# Patient Record
Sex: FEMALE | URBAN - METROPOLITAN AREA
[De-identification: names, ages, dates, MRNs, and addresses within clinical notes are randomized per-mention and may not be internally consistent; named-entity substitution may affect disease eponyms.]

---

## 2017-12-20 ENCOUNTER — APPOINTMENT (RX ONLY)
Dept: URBAN - METROPOLITAN AREA CLINIC 91 | Facility: CLINIC | Age: 82
Setting detail: DERMATOLOGY
End: 2017-12-20

## 2017-12-20 DIAGNOSIS — L82.1 OTHER SEBORRHEIC KERATOSIS: ICD-10-CM

## 2017-12-20 DIAGNOSIS — L57.0 ACTINIC KERATOSIS: ICD-10-CM

## 2017-12-20 PROBLEM — L85.3 XEROSIS CUTIS: Status: ACTIVE | Noted: 2017-12-20

## 2017-12-20 PROBLEM — J44.9 CHRONIC OBSTRUCTIVE PULMONARY DISEASE, UNSPECIFIED: Status: ACTIVE | Noted: 2017-12-20

## 2017-12-20 PROBLEM — I10 ESSENTIAL (PRIMARY) HYPERTENSION: Status: ACTIVE | Noted: 2017-12-20

## 2017-12-20 PROBLEM — Z85.3 PERSONAL HISTORY OF MALIGNANT NEOPLASM OF BREAST: Status: ACTIVE | Noted: 2017-12-20

## 2017-12-20 PROCEDURE — 17000 DESTRUCT PREMALG LESION: CPT

## 2017-12-20 PROCEDURE — 17003 DESTRUCT PREMALG LES 2-14: CPT

## 2017-12-20 PROCEDURE — ? LIQUID NITROGEN

## 2017-12-20 PROCEDURE — 99213 OFFICE O/P EST LOW 20 MIN: CPT | Mod: 25

## 2017-12-20 PROCEDURE — ? COUNSELING

## 2017-12-20 ASSESSMENT — LOCATION ZONE DERM
LOCATION ZONE: HAND
LOCATION ZONE: ARM
LOCATION ZONE: FACE
LOCATION ZONE: TRUNK
LOCATION ZONE: EAR

## 2017-12-20 ASSESSMENT — LOCATION SIMPLE DESCRIPTION DERM
LOCATION SIMPLE: LEFT FOREARM
LOCATION SIMPLE: LEFT CHEEK
LOCATION SIMPLE: LEFT HAND
LOCATION SIMPLE: ABDOMEN
LOCATION SIMPLE: RIGHT HAND
LOCATION SIMPLE: LEFT EAR

## 2017-12-20 ASSESSMENT — PAIN INTENSITY VAS: HOW INTENSE IS YOUR PAIN 0 BEING NO PAIN, 10 BEING THE MOST SEVERE PAIN POSSIBLE?: NO PAIN

## 2017-12-20 ASSESSMENT — LOCATION DETAILED DESCRIPTION DERM
LOCATION DETAILED: LEFT DISTAL DORSAL FOREARM
LOCATION DETAILED: RIGHT RADIAL DORSAL HAND
LOCATION DETAILED: LEFT SUPERIOR HELIX
LOCATION DETAILED: LEFT PROXIMAL DORSAL FOREARM
LOCATION DETAILED: LEFT RADIAL DORSAL HAND
LOCATION DETAILED: LEFT CENTRAL MALAR CHEEK
LOCATION DETAILED: LEFT LATERAL ABDOMEN
LOCATION DETAILED: LEFT ULNAR DORSAL HAND

## 2017-12-20 NOTE — PROCEDURE: LIQUID NITROGEN
Number Of Freeze-Thaw Cycles: 1 freeze-thaw cycle
Consent: The patient's verbal consent was obtained including but not limited to risks of crusting, scabbing, blistering, scarring, darker or lighter pigmentary change, recurrence, incomplete removal and infection.
Detail Level: Detailed
Render Post-Care Instructions In Note?: no
Duration Of Freeze Thaw-Cycle (Seconds): 5
Post-Care Instructions: I reviewed with the patient in detail post-care instructions. Patient is to wear sunprotection, and avoid picking at any of the treated lesions. Pt may apply Vaseline to crusted or scabbing areas. If any treated lesion does not resolve patient is asked to return for further treatment and/or biopsy.

## 2018-08-16 ENCOUNTER — HOSPITAL ENCOUNTER (INPATIENT)
Dept: HOSPITAL 92 - ERS | Age: 83
LOS: 5 days | Discharge: HOSPICE | DRG: 871 | End: 2018-08-21
Attending: HOSPITALIST | Admitting: HOSPITALIST
Payer: MEDICARE

## 2018-08-16 VITALS — BODY MASS INDEX: 28.6 KG/M2

## 2018-08-16 DIAGNOSIS — J18.9: ICD-10-CM

## 2018-08-16 DIAGNOSIS — A41.52: Primary | ICD-10-CM

## 2018-08-16 DIAGNOSIS — N17.9: ICD-10-CM

## 2018-08-16 DIAGNOSIS — I42.9: ICD-10-CM

## 2018-08-16 DIAGNOSIS — L89.152: ICD-10-CM

## 2018-08-16 DIAGNOSIS — C50.912: ICD-10-CM

## 2018-08-16 DIAGNOSIS — Z66: ICD-10-CM

## 2018-08-16 DIAGNOSIS — B96.4: ICD-10-CM

## 2018-08-16 DIAGNOSIS — I48.92: ICD-10-CM

## 2018-08-16 DIAGNOSIS — G89.29: ICD-10-CM

## 2018-08-16 DIAGNOSIS — I11.0: ICD-10-CM

## 2018-08-16 DIAGNOSIS — J44.1: ICD-10-CM

## 2018-08-16 DIAGNOSIS — J44.0: ICD-10-CM

## 2018-08-16 DIAGNOSIS — E03.9: ICD-10-CM

## 2018-08-16 DIAGNOSIS — E87.6: ICD-10-CM

## 2018-08-16 DIAGNOSIS — E87.4: ICD-10-CM

## 2018-08-16 DIAGNOSIS — J96.00: ICD-10-CM

## 2018-08-16 DIAGNOSIS — R65.21: ICD-10-CM

## 2018-08-16 DIAGNOSIS — N39.0: ICD-10-CM

## 2018-08-16 DIAGNOSIS — B96.5: ICD-10-CM

## 2018-08-16 DIAGNOSIS — M54.9: ICD-10-CM

## 2018-08-16 DIAGNOSIS — Z88.5: ICD-10-CM

## 2018-08-16 DIAGNOSIS — F17.210: ICD-10-CM

## 2018-08-16 DIAGNOSIS — Z51.5: ICD-10-CM

## 2018-08-16 DIAGNOSIS — I50.21: ICD-10-CM

## 2018-08-16 LAB
ALBUMIN SERPL BCG-MCNC: 3.8 G/DL (ref 3.4–4.8)
ALP SERPL-CCNC: 63 U/L (ref 40–150)
ALT SERPL W P-5'-P-CCNC: 10 U/L (ref 8–55)
ANION GAP SERPL CALC-SCNC: 13 MMOL/L (ref 10–20)
AST SERPL-CCNC: 21 U/L (ref 5–34)
BACTERIA UR QL AUTO: (no result) HPF
BASOPHILS # BLD AUTO: 0.1 THOU/UL (ref 0–0.2)
BASOPHILS NFR BLD AUTO: 0.5 % (ref 0–1)
BILIRUB SERPL-MCNC: 0.7 MG/DL (ref 0.2–1.2)
BUN SERPL-MCNC: 14 MG/DL (ref 9.8–20.1)
CALCIUM SERPL-MCNC: 9.2 MG/DL (ref 7.8–10.44)
CHLORIDE SERPL-SCNC: 98 MMOL/L (ref 98–107)
CO2 SERPL-SCNC: 21 MMOL/L (ref 23–31)
CREAT CL PREDICTED SERPL C-G-VRATE: 0 ML/MIN (ref 70–130)
CRYSTAL-AUWI FLAG: 0 (ref 0–15)
EOSINOPHIL # BLD AUTO: 0 THOU/UL (ref 0–0.7)
EOSINOPHIL NFR BLD AUTO: 0.2 % (ref 0–10)
GLOBULIN SER CALC-MCNC: 2.6 G/DL (ref 2.4–3.5)
GLUCOSE SERPL-MCNC: 222 MG/DL (ref 83–110)
HEV IGM SER QL: 0.1 (ref 0–7.99)
HGB BLD-MCNC: 15 G/DL (ref 12–16)
LYMPHOCYTES # BLD: 0.9 THOU/UL (ref 1.2–3.4)
LYMPHOCYTES NFR BLD AUTO: 7.6 % (ref 21–51)
MCH RBC QN AUTO: 33.5 PG (ref 27–31)
MCV RBC AUTO: 95.6 FL (ref 78–98)
MONOCYTES # BLD AUTO: 0.4 THOU/UL (ref 0.11–0.59)
MONOCYTES NFR BLD AUTO: 2.9 % (ref 0–10)
NEUTROPHILS # BLD AUTO: 10.5 THOU/UL (ref 1.4–6.5)
NEUTROPHILS NFR BLD AUTO: 88.8 % (ref 42–75)
PATHC CAST-AUWI FLAG: 1.59 (ref 0–2.49)
PLATELET # BLD AUTO: 261 THOU/UL (ref 130–400)
POTASSIUM SERPL-SCNC: 3.5 MMOL/L (ref 3.5–5.1)
RBC # BLD AUTO: 4.48 MILL/UL (ref 4.2–5.4)
RBC UR QL AUTO: (no result) HPF (ref 0–3)
SODIUM SERPL-SCNC: 128 MMOL/L (ref 136–145)
SP GR UR STRIP: 1.01 (ref 1–1.04)
SPERM-AUWI FLAG: 0 (ref 0–9.9)
WBC # BLD AUTO: 11.9 THOU/UL (ref 4.8–10.8)
WBC UR QL AUTO: (no result) HPF (ref 0–3)
YEAST-AUWI FLAG: 0 (ref 0–25)

## 2018-08-16 PROCEDURE — 87186 SC STD MICRODIL/AGAR DIL: CPT

## 2018-08-16 PROCEDURE — 74176 CT ABD & PELVIS W/O CONTRAST: CPT

## 2018-08-16 PROCEDURE — 96367 TX/PROPH/DG ADDL SEQ IV INF: CPT

## 2018-08-16 PROCEDURE — 80048 BASIC METABOLIC PNL TOTAL CA: CPT

## 2018-08-16 PROCEDURE — 89051 BODY FLUID CELL COUNT: CPT

## 2018-08-16 PROCEDURE — 83615 LACTATE (LD) (LDH) ENZYME: CPT

## 2018-08-16 PROCEDURE — 87040 BLOOD CULTURE FOR BACTERIA: CPT

## 2018-08-16 PROCEDURE — 82805 BLOOD GASES W/O2 SATURATION: CPT

## 2018-08-16 PROCEDURE — 85060 BLOOD SMEAR INTERPRETATION: CPT

## 2018-08-16 PROCEDURE — 88112 CYTOPATH CELL ENHANCE TECH: CPT

## 2018-08-16 PROCEDURE — C9113 INJ PANTOPRAZOLE SODIUM, VIA: HCPCS

## 2018-08-16 PROCEDURE — 83605 ASSAY OF LACTIC ACID: CPT

## 2018-08-16 PROCEDURE — 94760 N-INVAS EAR/PLS OXIMETRY 1: CPT

## 2018-08-16 PROCEDURE — 51701 INSERT BLADDER CATHETER: CPT

## 2018-08-16 PROCEDURE — 71045 X-RAY EXAM CHEST 1 VIEW: CPT

## 2018-08-16 PROCEDURE — 88342 IMHCHEM/IMCYTCHM 1ST ANTB: CPT

## 2018-08-16 PROCEDURE — 88305 TISSUE EXAM BY PATHOLOGIST: CPT

## 2018-08-16 PROCEDURE — 80202 ASSAY OF VANCOMYCIN: CPT

## 2018-08-16 PROCEDURE — 36556 INSERT NON-TUNNEL CV CATH: CPT

## 2018-08-16 PROCEDURE — 93010 ELECTROCARDIOGRAM REPORT: CPT

## 2018-08-16 PROCEDURE — 87205 SMEAR GRAM STAIN: CPT

## 2018-08-16 PROCEDURE — 82945 GLUCOSE OTHER FLUID: CPT

## 2018-08-16 PROCEDURE — 83986 ASSAY PH BODY FLUID NOS: CPT

## 2018-08-16 PROCEDURE — 87116 MYCOBACTERIA CULTURE: CPT

## 2018-08-16 PROCEDURE — 36415 COLL VENOUS BLD VENIPUNCTURE: CPT

## 2018-08-16 PROCEDURE — 96375 TX/PRO/DX INJ NEW DRUG ADDON: CPT

## 2018-08-16 PROCEDURE — 84100 ASSAY OF PHOSPHORUS: CPT

## 2018-08-16 PROCEDURE — 80053 COMPREHEN METABOLIC PANEL: CPT

## 2018-08-16 PROCEDURE — 81003 URINALYSIS AUTO W/O SCOPE: CPT

## 2018-08-16 PROCEDURE — 70450 CT HEAD/BRAIN W/O DYE: CPT

## 2018-08-16 PROCEDURE — 84157 ASSAY OF PROTEIN OTHER: CPT

## 2018-08-16 PROCEDURE — 96365 THER/PROPH/DIAG IV INF INIT: CPT

## 2018-08-16 PROCEDURE — 96361 HYDRATE IV INFUSION ADD-ON: CPT

## 2018-08-16 PROCEDURE — 94640 AIRWAY INHALATION TREATMENT: CPT

## 2018-08-16 PROCEDURE — 93306 TTE W/DOPPLER COMPLETE: CPT

## 2018-08-16 PROCEDURE — 81015 MICROSCOPIC EXAM OF URINE: CPT

## 2018-08-16 PROCEDURE — 87070 CULTURE OTHR SPECIMN AEROBIC: CPT

## 2018-08-16 PROCEDURE — 85025 COMPLETE CBC W/AUTO DIFF WBC: CPT

## 2018-08-16 PROCEDURE — 94660 CPAP INITIATION&MGMT: CPT

## 2018-08-16 PROCEDURE — 93005 ELECTROCARDIOGRAM TRACING: CPT

## 2018-08-16 PROCEDURE — 87206 SMEAR FLUORESCENT/ACID STAI: CPT

## 2018-08-16 PROCEDURE — 87077 CULTURE AEROBIC IDENTIFY: CPT

## 2018-08-16 PROCEDURE — 87149 DNA/RNA DIRECT PROBE: CPT

## 2018-08-16 PROCEDURE — 88341 IMHCHEM/IMCYTCHM EA ADD ANTB: CPT

## 2018-08-16 PROCEDURE — 87086 URINE CULTURE/COLONY COUNT: CPT

## 2018-08-16 PROCEDURE — 83735 ASSAY OF MAGNESIUM: CPT

## 2018-08-16 PROCEDURE — A4353 INTERMITTENT URINARY CATH: HCPCS

## 2018-08-16 NOTE — RAD
RADIOGRAPH CHEST 1 VIEW:

 

Date: 8/16/18

Time: 7:50 p.m.

 

HISTORY: 

84-year-old female with fever. 

 

COMPARISON: 

1/1/16. 

 

FINDINGS:

Ectasia and tortuosity of the thoracic aorta. Prior studies were lateral decubitus views, and therefo
re direct comparison of the apparently enlarged right hilar shadow, is difficult. Lungs are hypoinfla
nasra. There is a region of nonspecific increased attenuation at the right lateral base. No consolidati
on or pulmonary edema in the rest of the visualized lung fields. No pneumothorax. No effacement of la
teral costophrenic angles. 

 

IMPRESSION:

1.      Nonspecific area of faintly low attenuation at the right lateral base. This does not appear t
ypical for an infiltrate, and perhaps is artifact. 

2.      No evidence of pneumonia elsewhere.

3.      enlarged appearance of the right hilum.

 

 

JN []

 

POS: JIN

## 2018-08-17 LAB
ANION GAP SERPL CALC-SCNC: 12 MMOL/L (ref 10–20)
BASOPHILS # BLD AUTO: 0 THOU/UL (ref 0–0.2)
BASOPHILS NFR BLD AUTO: 0.1 % (ref 0–1)
BUN SERPL-MCNC: 11 MG/DL (ref 9.8–20.1)
CALCIUM SERPL-MCNC: 8 MG/DL (ref 7.8–10.44)
CHLORIDE SERPL-SCNC: 107 MMOL/L (ref 98–107)
CO2 SERPL-SCNC: 17 MMOL/L (ref 23–31)
CREAT CL PREDICTED SERPL C-G-VRATE: 74 ML/MIN (ref 70–130)
EOSINOPHIL # BLD AUTO: 0 THOU/UL (ref 0–0.7)
EOSINOPHIL NFR BLD AUTO: 0.2 % (ref 0–10)
GLUCOSE SERPL-MCNC: 185 MG/DL (ref 83–110)
HGB BLD-MCNC: 13.9 G/DL (ref 12–16)
LYMPHOCYTES # BLD: 1.9 THOU/UL (ref 1.2–3.4)
LYMPHOCYTES NFR BLD AUTO: 16.4 % (ref 21–51)
MCH RBC QN AUTO: 32 PG (ref 27–31)
MCV RBC AUTO: 96.4 FL (ref 78–98)
MONOCYTES # BLD AUTO: 0.9 THOU/UL (ref 0.11–0.59)
MONOCYTES NFR BLD AUTO: 7.9 % (ref 0–10)
NEUTROPHILS # BLD AUTO: 8.9 THOU/UL (ref 1.4–6.5)
NEUTROPHILS NFR BLD AUTO: 75.3 % (ref 42–75)
PLATELET # BLD AUTO: 199 THOU/UL (ref 130–400)
POTASSIUM SERPL-SCNC: 3.4 MMOL/L (ref 3.5–5.1)
RBC # BLD AUTO: 4.34 MILL/UL (ref 4.2–5.4)
SODIUM SERPL-SCNC: 133 MMOL/L (ref 136–145)
WBC # BLD AUTO: 11.8 THOU/UL (ref 4.8–10.8)

## 2018-08-17 PROCEDURE — 3E033XZ INTRODUCTION OF VASOPRESSOR INTO PERIPHERAL VEIN, PERCUTANEOUS APPROACH: ICD-10-PCS | Performed by: HOSPITALIST

## 2018-08-17 PROCEDURE — 02HV33Z INSERTION OF INFUSION DEVICE INTO SUPERIOR VENA CAVA, PERCUTANEOUS APPROACH: ICD-10-PCS | Performed by: INTERNAL MEDICINE

## 2018-08-17 RX ADMIN — VANCOMYCIN HYDROCHLORIDE SCH MLS: 1 INJECTION, POWDER, LYOPHILIZED, FOR SOLUTION INTRAVENOUS at 16:46

## 2018-08-17 RX ADMIN — ASPIRIN SCH MG: 81 TABLET ORAL at 08:32

## 2018-08-17 NOTE — PDOC.PULCN
<Jean Pierre Galvan - Last Filed: 08/17/18 10:13>





Pulmonology Consult: HPI





- Date of Consult


Date: 08/17/18


Time: 08:00





- Consult Details


Reason for Consult: 





ICU admission needed pressure support





- History of Present Illness


HPI: 


TU VALLADARES is a 84 year-old F





83 yo F admitted after collapsing at home. She states that she was having a 

normal day prior to the event and suddenly felt weak and collapsed. She denies 

LOC or hitting her head. In the ED she was noted to have a UA c/w UTI, though 

pt denies symptoms such as frequency or dysuria. She was noted to be 

hypotensive and required pressure support. R SC CVC was placed, however the 

distal tip is in the IJ. Patient was started on Vanc and zosyn in the ER. This 

morning pt complains of new cough and wheezing. She denies prior hx of similar 

symptoms. 





Pulmonology Consult: ROS





- Review of Systems


Constitutional: sweats, weakness.  negative: fever, chills


Cardiovascular: negative: chest pain, palpitations, edema


Respiratory: productive cough, wheezing.  negative: bloody sputum, short of 

breath





Pulmonology Consult: PMH


Source: patient


Past Medical History: 





HTN, L Breast Ca s/p lumpectomy, hypothyroid





- Family History


Family history: reviewed and not pertinent





- Social History


Smoking Status: Current every day smoker, Smokes 11 or more cig/day


Pack Years: 40


Alcohol Use: none


Drug Use History: none


Living Situation: independent





Pulmonology Consult: Meds





- Medications


MAR Reviewed: Yes


Medications: 


 Current Medications





Acetaminophen (Tylenol)  650 mg PO Q4H PRN


   PRN Reason: Headache/Fever or Pain


Aspirin (Ecotrin)  81 mg PO DAILY [facility]


Enoxaparin Sodium (Lovenox)  40 mg SC 0900 [facility]


Gabapentin (Neurontin)  300 mg PO HS [facility]


Piperacillin Sod/Tazobactam (Sod 3.375 gm/ Sodium Chloride)  100 mls @ 200 mls/

hr IVPB Q6HR [facility]


   Last Admin: 08/17/18 05:40 Dose:  100 mls


Norepinephrine Bitartrate (Levophed)  250 mls @ 0 mls/hr IVPB INF [facility]; Protocol


Sodium Chloride (Normal Saline 0.9%)  1,000 mls @ 100 mls/hr IV .Q10H [facility]


   Last Admin: 08/17/18 03:27 Dose:  1,000 mls


Vancomycin HCl 1.25 gm/ Sodium (Chloride)  250 mls @ 166.667 mls/hr IVPB 1600 

[facility]


Levothyroxine Sodium (Synthroid)  100 mcg PO 0600 [facility]


   Last Admin: 08/17/18 05:44 Dose:  100 mcg


Miscellaneous Medication (Pharmacy To Dose)  0 each IVPB PRN PRN


   PRN Reason: VANCOMYCIN


Ondansetron HCl (Zofran)  4 mg IVP Q6H PRN


   PRN Reason: Nausea/Vomiting


Pantoprazole Sodium (Protonix)  40 mg PO DAILY [facility]











- Allergies


Allergies/Adverse Reactions: 


 Allergies











Allergy/AdvReac Type Severity Reaction Status Date / Time


 


morphine Allergy   Verified 12/31/15 21:24














Pulmonology Consult: PE





- Physical Exam


Constitutional: NAD


HEENT: PERRLA, moist MMs


Neck: no nodes, no JVD


Cardiovascular: RRR


Deviation from normal: Difficult to asucultate heart sounds over wheezing


Respiratory: wheezes (throughout)


Gastrointestinal: soft, non-tender, no distention, positive bowel sounds


Musculoskeletal: no edema


Neurological: non-focal, normal sensation, moves all 4 limbs


Psychiatric: normal affect, A&O x 3


Skin: no rash, normal turgor





Pulmonology Consult: Results





- Labs


Result Diagrams: 


 08/17/18 04:27





 08/17/18 04:26





- Radiology Interpretation


  ** CT scan - chest


Status: image reviewed by me, report reviewed by me (b/l pleural effusion, 13 

mm nodule in L lingula)





  ** Chest x-ray


Status: image reviewed by me, report reviewed by me (SC CVC ending in R IJ, 

pulmonary vs chest wall density L lung)





  ** CT scan - abdomen


Status: image reviewed by me, report reviewed by me (Soft tissue mass L 

posterior L4)





Pulmonology Consult: A/P





- Problem


(1) Sepsis


Current Visit: No   Code(s): A41.9 - SEPSIS, UNSPECIFIED ORGANISM   Status: 

Acute   





(2) COPD (chronic obstructive pulmonary disease)


Current Visit: Yes   Status: Suspected   





(3) UTI (urinary tract infection)


Current Visit: Yes   Status: Acute   





(4) Soft tissue mass


Current Visit: Yes   Code(s): M79.9 - SOFT TISSUE DISORDER, UNSPECIFIED   Status

: Acute   





- Time


Time: 


50% of the time was spent in coordination of care (as documented) at patient's 

floor/unit and/or counseling patient.





Time with Patient: greater than 50 minutes





- Plan


Plan: 





Urosepsis 


- pt doing much better this morning that at time of admission. No longer 

requiring pressure support. Pt has received adequate IVF. 


- change abx to rocephin daily, cultures pending 


- may be appropriate to leave ICU today if BP continues to be stable





COPD


- this is the most likely cause of the patient's wheezing. Though she does not 

carry the dx, her hx is certainly c/w COPD. 


- not currently requiring O2, does not have home O2


- Continue to monitir





Soft tissue mass posterior L4


- incidental finding on CT. May need further work up in outpt setting. 





HTN


- BP currently stable. Restart home meds when appropriate





<Quentin Perez - Last Filed: 08/17/18 12:36>





Pulmonology Consult: HPI





- History of Present Illness


HPI: 


TU VALLADARES is a 84 year-old F











Pulmonology Consult: Meds





- Medications


Medications: 


 Current Medications





Acetaminophen (Tylenol)  650 mg PO Q4H PRN


   PRN Reason: Headache/Fever or Pain


Aspirin (Ecotrin)  81 mg PO DAILY [facility]


   Last Admin: 08/17/18 08:32 Dose:  81 mg


Enoxaparin Sodium (Lovenox)  40 mg SC 0900 [facility]


   Last Admin: 08/17/18 08:32 Dose:  40 mg


Gabapentin (Neurontin)  300 mg PO HS [facility]


Piperacillin Sod/Tazobactam (Sod 3.375 gm/ Sodium Chloride)  100 mls @ 200 mls/

hr IVPB Q6HR [facility]


   Last Admin: 08/17/18 05:40 Dose:  100 mls


Norepinephrine Bitartrate (Levophed)  250 mls @ 0 mls/hr IVPB INF [facility]; Protocol


Sodium Chloride (Normal Saline 0.9%)  1,000 mls @ 100 mls/hr IV .Q10H [facility]


   Last Admin: 08/17/18 03:27 Dose:  1,000 mls


Vancomycin HCl 1.25 gm/ Sodium (Chloride)  250 mls @ 166.667 mls/hr IVPB 1600 

[facility]


Levothyroxine Sodium (Synthroid)  100 mcg PO 0600 [facility]


   Last Admin: 08/17/18 05:44 Dose:  100 mcg


Miscellaneous Medication (Pharmacy To Dose)  0 each IVPB PRN PRN


   PRN Reason: VANCOMYCIN


Ondansetron HCl (Zofran)  4 mg IVP Q6H PRN


   PRN Reason: Nausea/Vomiting


   Last Admin: 08/17/18 08:28 Dose:  4 mg


Pantoprazole Sodium (Protonix)  40 mg PO DAILY [facility]


   Last Admin: 08/17/18 08:32 Dose:  40 mg











Pulmonology Consult: Results





- Labs


Result Diagrams: 


 08/17/18 04:27





 08/17/18 04:26





Pulmonology Consult: A/P





- Time


Time: 


50% of the time was spent in coordination of care (as documented) at patient's 

floor/unit and/or counseling patient.








Attending Addendum





- Attending Addendum


Date/Time: 08/17/18 0341





I personally evaluated the patient and discussed the management with Dr. Galvan


I agree with the History, Examination, Assessment and Plan documented above 

with any addition or exceptions noted below.





70 minutes have been devoted to this patient in various activities.  I 

personally reviewed all imaging studies and laboratory data noted within this 

document.  For fifty percent of this time, I was interacting with the patient 

at the bedside or coordinating care with the care team.  For the remainder of 

the time I was immediately available to the patient in the hospital unit.

## 2018-08-17 NOTE — HP
PRIMARY CARE PHYSICIAN:   _____.

 

CODE STATUS:  FULL CODE.

 

TIME OF EVALUATION:  11:45 p.m.

 

CHIEF COMPLAINT:  Confusion.

 

HISTORY OF PRESENT ILLNESS:  This is an 84-year-old female patient with past medical history of hyper
tension, left breast cancer without treatment, chronic back pain.  The patient came to the hospital a
fter having a fall, patient had generalized weakness, symptoms were severe, no clear triggers, no all
eviating factors associated with disorientation, confusion.  Patient was found to have fever in the E
R.

 

REVIEW OF SYSTEMS:  Unable to fully obtain since patient has a change in mental status.

 

PAST MEDICAL HISTORY:  Mentioned on the HPI.

 

PAST SURGICAL HISTORY:  Appendectomy, hysterectomy, left breast biopsy.

 

PSYCHIATRIC HISTORY:  No previous psychiatric history.

 

FAMILY HISTORY:  Reviewed and noncontributory for current presentation.

 

SOCIAL HISTORY:  Patient used tobacco, smokes cigarettes daily.  Patient smoked 1 pack per day.

 

DRUG ALLERGIES:  MORPHINE.

 

REPORTED MEDICATIONS:  Levothyroxine, captopril, aspirin, gabapentin.

 

PHYSICAL EXAMINATION:

VITAL SIGNS:  On presentation blood pressure 113/62 with heart rate 120, pain was 0, oxygen saturatio
ns 90.

CONSTITUTIONAL:  Patient had fever, chills, generalized weakness, severe, disoriented, not in any acu
te distress.

HEENT:  Eyes:  Normal conjuctivae.  Dry oral mucosa.  Anicteric.

NECK:  No JVD.

RESPIRATORY:  Bilateral air entry.  No rales, no wheezing.  Symmetric expansion.

CARDIOVASCULAR:  Normal rate, regular rhythm.  No murmurs, no gallop, no edema.

ABDOMEN:  Soft, normal bowel sounds.

MUSCULOSKELETAL:  Baseline range of motion and strength.  No tenderness.

SKIN:  Warm and intact.  No pallor, no rash, no redness.  Peripheral pulses are present.  Capillary r
efill seems to be intact.

NEUROLOGIC:  Baseline sensory.  No evidence of any new focal weakness.  Baseline speech.  Cranial ner
ves seem to be intact.

PSYCHIATRIC:  Patient is disoriented, unable to fully explore.

 

LABORATORY DATA:  EKG was reviewed.  The patient has sinus tachycardia with first-degree AV block, le
ft axis deviation, complete RBBB, septal infarct.  T-wave abnormality consider inferior lateral ische
emily, ventricular rate 116, , , QT corrected 453.  Chest x-ray was reviewed.  The patient
 had no specific area of pain to the low attenuation of the right lateral base.  Perhaps, this does n
ot appear typical for an infiltrate perhaps represent artifacts.  No evidence of pneumonia _____ enla
rgement of the right ilium.

 

LABORATORY DATA:  Reviewed.  The patient's white count 11.9, hemoglobin 15, MCV 95, platelet count 26
1.  Sodium 128, potassium 3.5, chloride 98, carbon dioxide 21, anion gap 13, BUN 14, creatinine 0.7, 
GFR 71, glucose 222.  Urine was positive, white count 21-50.

 

ASSESSMENT AND PLAN:  The patient will be placed in the hospital with following medical conditions:  
Critical care time more than 35 minutes span at bedside for patient stabilization, assessment of the 
patient, family meeting, coordination of care, reviewing the elaboration of records.

1.  Patient is in septic shock with fever, hypotension needing vasopressors, patient also receiving a
ntibiotics, we will follow cultures, we will adjust treatment as needed.  We will do CAT scan wheneve
r patient is stable to rule out complicated urinary tract infection.

2.   Urinary tract infection, positive UA, urine culture pending.  We will follow results, we will ad
just treatment as needed.  Management as above.  Patient received a full liter of fluids. 

3.  Uncontrolled hypertension.  Patient presented with shock, did not give any blood pressure medicat
ion at this point.

4.  Hypothyroidism.  Continue hormone replacement.

5.  Chronic obstructive pulmonary disease, this is chronic, seems to be stable, we will reconcile eriberto
e medications.

6.  Deep venous thrombosis prophylaxis.

7.  History of left breast malignancy, patient has opted for no treatment.

## 2018-08-17 NOTE — PDOC.EVN
Event Note





- Event Note


Event Note: 





central line was in not appropriate position, needs to be removed and placement 

of a new one if still needed for vasopressors

## 2018-08-17 NOTE — PDOC.PN
- Subjective


Encounter Start Date: 08/17/18


Encounter Start Time: 09:00





Doing well today.  Feeling better.  Did not have any symptoms of the UTI.  





- Objective


Resuscitation Status: 


 











Resuscitation Status           FULL:Full Resuscitation














Vital Signs & Weight: 


 Vital Signs (12 hours)











  Temp Pulse Pulse BP BP Pulse Ox Pulse Ox


 


 08/17/18 19:00  99.0 F      


 


 08/17/18 16:00  98.1 F      


 


 08/17/18 14:44   97  103 H  108/81  132/64  96  95








 Weight











Weight                         171 lb 15.369 oz











 Most Recent Monitor Data











Heart Rate from ECG            97


 


NIBP                           120/65


 


NIBP BP-Mean                   92


 


Respiration from ECG           24


 


SpO2                           100














I&O: 


 











 08/16/18 08/17/18 08/18/18





 06:59 06:59 06:59


 


Intake Total  511.2 1940


 


Output Total   1575


 


Balance  511.2 365











Result Diagrams: 


 08/17/18 04:27





 08/17/18 04:26





Phys Exam





- Physical Examination


Constitutional: NAD


Respiratory: no wheezing, no rales, no rhonchi, wheezing present


Cardiovascular: RRR, no significant murmur


Gastrointestinal: soft, non-tender, no distention


Musculoskeletal: no edema


Psychiatric: normal affect





Dx/Plan


(1) UTI (urinary tract infection)


Status: Acute   





(2) COPD (chronic obstructive pulmonary disease)


Status: Suspected   





(3) Sepsis


Code(s): A41.9 - SEPSIS, UNSPECIFIED ORGANISM   Status: Acute   





- Plan





* Continue abx.  Follow up cultures. Transfer to floor.  Off of pressors.

## 2018-08-17 NOTE — RAD
RADIOGRAPH CHEST 1 VIEW:

 

Date: 08/17/18

Time: 0039 HOURS

 

HISTORY: 

84-year-old female status post central line placement. 

 

COMPARISON: 

08/16/18 at 1950 hours. 

 

FINDINGS:

There is a new right subclavian central vascular catheter which ascends the right neck, with distal t
ip outside of the field of view. Prominent right hilum is again noted. Elevated right hemidiaphragm. 
Small, ill-defined region of increased attenuation at the lateral aspect of the left lower lung zone,
 apparently new since the prior study. No pulmonary edema. No pneumothorax. 

 

IMPRESSION:

1.  Right subclavian central vascular catheter ascends the right neck. 

2.  No pneumothorax. 

3.  Questionable new focal pulmonary versus chest wall density at left lateral lower lung zone versus
 artifact. Recommend follow-up. 

4.  Enlargement of right hilum. 

 

 

 

STEPHENIE [] 

 

POS: REN

## 2018-08-17 NOTE — CT
PRELIMINARY REPORT/[facility]/EMERGENTY AFTER-HOURS PROCEDURE 

 

Addendum created by Saulo Sood MD on 8/17/2018 6:53 AM Central Time ([place] & [place]) Findings were dis
cussed with Winston Tan at 8/17/2018 6:51 AM CDT.

 

Initial Report created on 8/17/2018 6:46 AM Central Time ([place] & [place])

 

CT Abdomen and Pelvis Without Intravenous Contrast

 

EXAM DATE/TIME:

Exam ordered 8/17/2018 5:12 AM

 

CLINICAL HISTORY:

84 years old, female; Pain; Abdominal pain; Generalized; Patient HX: R/O complicated uti

 

TECHNIQUE:

Axial computed tomography images of the abdomen and pelvis without intravenous contrast. Coronal refo
rmatted images were created and reviewed.

 

COMPARISON:

No relevant prior studies available.

 

FINDINGS:

Lung bases: There is a 13 mm nodule within the LEFT lingula.

Pleural space: There are small bilateral pleural effusions and interlobular thickening consistent wit
h pulmonary edema.

 

ABDOMEN:

Liver: The liver demonstrates punctate calcifications, consistent with remote granulomatous organism 
exposure.

Gallbladder and bile ducts: The gallbladder is normal. There is no evidence of biliary ductal dilatio
n. No calcified stones.

Pancreas: The pancreas appears normal. No ductal dilation.

Spleen: The spleen demonstrates punctate calcifications, consistent with remote granulomatous organis
m exposure.

Adrenals: The adrenal glands are normal.

Kidneys and ureters: The kidneys appear normal. No obstructing stones. No hydronephrosis.

Stomach and bowel: The stomach is normal. The duodenum is unremarkable. The colon is normal.

No obstruction. No mucosal thickening.

 

PELVIS:

Appendix: No findings to suggest acute appendicitis.

Bladder: The bladder is normal. No stones.

Reproductive: The uterus is not visualized, and may be atrophic or surgically absent.

 

ABDOMEN and PELVIS:

Intraperitoneal space: Normal. No free air. No significant fluid collection.

Bones/joints: There is a 2.7 x 1.6 x 2.0 cm soft tissue mass within the LEFT posterior elements of L4
. There are moderate lumbar spine degenerative changes. No acute fracture. No dislocation.

Soft tissues: There is questionable LEFT breast soft tissue thickening, incompletely visualized.

Vasculature: Normal. No abdominal aortic aneurysm.

Lymph nodes: Normal. No enlarged lymph nodes.

 

IMPRESSION:

1. There is a 13 mm nodule within the LEFT lingula.

2. There is a 2.7 x 1.6 x 2.0 cm soft tissue mass within the LEFT posterior elements of L4. Further w
orkup is recommended.

3. Small bilateral pleural effusions with mild pulmonary edema.

 

Thank you for allowing us to participate in the care of your patient.

Dictated and Authenticated by: Saulo Sood MD

08/17/2018 6:46 AM Central Time ([place] & [place])

 

 

FINAL REPORT 

 

CT ABDOMEN AND PELVIS WITHOUT CONTRAST:

 

HISTORY: 

Complicated urinary tract infection.

 

COMPARISON: 

None.

 

FINDINGS: 

The findings and impression are concordant with the preliminary report.  In addition, there is abnorm
al wall skin thickening over the left breast.

 

IMPRESSION: 

Findings and impression are concordant with the preliminary report.  Diagnostic mammography and ultra
sound is recommended.  After workup of this left breast soft tissue thickening, histologic sampling m
ay be necessary at the L4 posterior element mass.

 

CODE: T

 

POS: REN

## 2018-08-18 LAB
ANALYZER IN CARDIO: (no result)
ANALYZER IN CARDIO: (no result)
ANION GAP SERPL CALC-SCNC: 14 MMOL/L (ref 10–20)
ANION GAP SERPL CALC-SCNC: 16 MMOL/L (ref 10–20)
BASE EXCESS STD BLDA CALC-SCNC: -11.8 MEQ/L
BASE EXCESS STD BLDA CALC-SCNC: -8.1 MEQ/L
BUN SERPL-MCNC: 10 MG/DL (ref 9.8–20.1)
BUN SERPL-MCNC: 13 MG/DL (ref 9.8–20.1)
CA-I BLDA-SCNC: 1.2 MMOL/L (ref 1.12–1.3)
CA-I BLDA-SCNC: 1.2 MMOL/L (ref 1.12–1.3)
CALCIUM SERPL-MCNC: 8.2 MG/DL (ref 7.8–10.44)
CALCIUM SERPL-MCNC: 8.6 MG/DL (ref 7.8–10.44)
CHLORIDE SERPL-SCNC: 102 MMOL/L (ref 98–107)
CHLORIDE SERPL-SCNC: 103 MMOL/L (ref 98–107)
CO2 SERPL-SCNC: 19 MMOL/L (ref 23–31)
CO2 SERPL-SCNC: 19 MMOL/L (ref 23–31)
CREAT CL PREDICTED SERPL C-G-VRATE: 43 ML/MIN (ref 70–130)
CREAT CL PREDICTED SERPL C-G-VRATE: 45 ML/MIN (ref 70–130)
GLUCOSE SERPL-MCNC: 268 MG/DL (ref 83–110)
GLUCOSE SERPL-MCNC: 305 MG/DL (ref 83–110)
HCO3 BLDA-SCNC: 17.8 MEQ/L (ref 22–28)
HCO3 BLDA-SCNC: 20 MEQ/L (ref 22–28)
HCT VFR BLDA CALC: 44 % (ref 36–47)
HCT VFR BLDA CALC: 45 % (ref 36–47)
HGB BLD-MCNC: 14.8 G/DL (ref 12–16)
HGB BLDA-MCNC: 14.9 G/DL (ref 12–16)
HGB BLDA-MCNC: 15.3 G/DL (ref 12–16)
MAGNESIUM SERPL-MCNC: 1.7 MG/DL (ref 1.6–2.6)
MCH RBC QN AUTO: 32.4 PG (ref 27–31)
MCV RBC AUTO: 98.4 FL (ref 78–98)
MDIFF COMPLETE?: YES
O2 A-A PPRESDIFF RESPIRATORY: 543.3 MM[HG] (ref 0–20)
PCO2 BLDA: 50.8 MMHG (ref 35–45)
PCO2 BLDA: 54.8 MMHG (ref 35–45)
PH BLDA: 7.13 [PH] (ref 7.35–7.45)
PH BLDA: 7.21 [PH] (ref 7.35–7.45)
PLATELET # BLD AUTO: 207 THOU/UL (ref 130–400)
PO2 BLDA: 87.8 MMHG (ref 60–?)
PO2 BLDA: 96.2 MMHG (ref 60–?)
POTASSIUM SERPL-SCNC: 3.7 MMOL/L (ref 3.5–5.1)
POTASSIUM SERPL-SCNC: 3.7 MMOL/L (ref 3.5–5.1)
RBC # BLD AUTO: 4.56 MILL/UL (ref 4.2–5.4)
SODIUM SERPL-SCNC: 131 MMOL/L (ref 136–145)
SODIUM SERPL-SCNC: 134 MMOL/L (ref 136–145)
SPECIMEN DRAWN FROM PATIENT: (no result)
SPECIMEN DRAWN FROM PATIENT: (no result)
VANCOMYCIN TROUGH SERPL-MCNC: 7.9 UG/ML
WBC # BLD AUTO: 22 THOU/UL (ref 4.8–10.8)

## 2018-08-18 RX ADMIN — VANCOMYCIN HYDROCHLORIDE SCH: 1 INJECTION, POWDER, LYOPHILIZED, FOR SOLUTION INTRAVENOUS at 16:40

## 2018-08-18 RX ADMIN — ASPIRIN SCH: 81 TABLET ORAL at 16:40

## 2018-08-18 NOTE — PDOC.PN
- Subjective


Encounter Start Date: 08/18/18


Encounter Start Time: 10:15





She took a turn for worse during the night.  She is having some respiratory 

failure and requiring non-rebreather.  Non-verbal.





- Objective


Resuscitation Status: 


 











Resuscitation Status           DNR:Do Not Resuscitate














Vital Signs & Weight: 


 Vital Signs (12 hours)











  Temp Pulse Resp Pulse Ox


 


 08/18/18 14:31   124 H  


 


 08/18/18 12:26   117 H  


 


 08/18/18 12:00  100.3 F H   


 


 08/18/18 10:43   120 H  


 


 08/18/18 08:00  99.7 F H  119 H  34 H  97


 


 08/18/18 07:45   119 H  


 


 08/18/18 07:00  99.8 F H   


 


 08/18/18 04:00  98.5 F    97








 Weight











Weight                         171 lb 15.369 oz











 Most Recent Monitor Data











Heart Rate from ECG            126


 


NIBP                           131/65


 


NIBP BP-Mean                   94


 


Respiration from ECG           33


 


SpO2                           80














I&O: 


 











 08/17/18 08/18/18 08/19/18





 06:59 06:59 06:59


 


Intake Total 511.2 3019 1000


 


Output Total  1955 480


 


Balance 511.2 1064 520











Result Diagrams: 


 08/18/18 08:55





 08/18/18 08:55





Phys Exam





- Physical Examination


Non-rebreather with tachypnea.  


Respiratory: no rales


Harsh bilateral wheezes and rales.


Cardiovascular: RRR, no significant murmur


Gastrointestinal: soft, non-tender, no distention


Musculoskeletal: no edema





Dx/Plan


(1) UTI (urinary tract infection)


Status: Acute   





(2) COPD (chronic obstructive pulmonary disease)


Status: Suspected   





(3) Sepsis


Code(s): A41.9 - SEPSIS, UNSPECIFIED ORGANISM   Status: Acute   





(4) Respiratory failure


Code(s): J96.90 - RESPIRATORY FAILURE, UNSP, UNSP W HYPOXIA OR HYPERCAPNIA   

Status: Acute   





(5) Pneumonia


Code(s): J18.9 - PNEUMONIA, UNSPECIFIED ORGANISM   Status: Acute   





- Plan





* She is on broad spectrum abx coverage.   Initially did well, but took a turn 

for the worse.  Suspect she is having repercussions from the hypotension from 

sepsis and suffering some end-organ damage.  Discussed with P/CC.  Trying 

bicarb for acidosis.  Family has made the patient DNR.

## 2018-08-18 NOTE — PRG
DATE OF SERVICE:  08/18/2018

 

SERVICE:  Pulmonary Medicine.

 

INTERVAL HISTORY:  The patient did poorly overnight.  She became increasingly 
lethargic.  She had increasing work of breathing and started grunting.  
Ultimately, an ABG was performed demonstrating acute hypercapnic as well as 
metabolic acidosis.  We temporized her with BiPAP.  We are in the investigation 
phase right now.  She cannot provide any additional elements of the history.  
Otherwise, there has been no interval change to her condition.

 

PHYSICAL EXAMINATION:

VITAL SIGNS:  Afebrile with temperature maximum 99.8, which is up trending, 
pulse 119 and irregular, blood pressure 114/64, respirations 30, saturation 96% 
on 60% FiO2 delivered via BiPAP at 10/5.

HEENT:  Normocephalic, atraumatic.  Sclerae are white, conjunctivae pink.  Oral 
and nasal mucosa is extremely dry.

LUNGS:  Rhonchi are present throughout bilateral lung fields.  I really do not 
appreciate crackles or wheezing.

HEART:  Tachycardic.  Regular.

ABDOMEN:  Soft, nontender, nondistended.  Bowel sounds are positive.

MUSCULOSKELETAL:  No cyanosis or clubbing.  There is no pitting in the 
bilateral lower extremities.

NEUROLOGIC:  Grossly nonfocal.

 

LABORATORY DATA:  Morning laboratories are currently pending.  We previously 
discontinued them, but because of her change in status, we are going to go 
ahead and order them.  She has gram negative rods growing in the urine culture 
and 1 out of 2 blood cultures growing coag negative staph.

 

IMAGING DATA:  Chest x-ray demonstrates small infiltrate in the right mid lung 
zone.  No evidence of pneumothorax is present.  There is blunting of bilateral 
costophrenic regions, possibly consistent with fluid.

 

ASSESSMENT:

1.  Acute hypoxic respiratory failure.

2.  Septic shock, resolving.

3.  Urinary tract infection.

4.  Chronic obstructive pulmonary disease with acute exacerbation.

5.  Combined metabolic and respiratory acidosis.

 

DISCUSSION AND PLAN:  We are going to talk to family about whether or not the 
patient would like to be aggressive under these circumstances.  I do believe 
that moving forward, she is likely going to require intubation in order to give 
this some  more time to clarify what happening here.  We are going to continue 
our empiric antibiotics.  Central line will be considered to help clarify our 
volume status.  CBC, basic metabolic profile, lactate, magnesium and phosphorus 
will be obtained under stat conditions.  Pulmonary or Critical Care will 
continue to follow along.  I have titrated BiPAP at bedside.  The patient is 
going to need quite a bit of attention throughout the day.

 

CRITICAL CARE TIME:  30 minutes.

 

HIRAL

## 2018-08-18 NOTE — RAD
SINGLE VIEW OF CHEST:

 

Date:  08/18/18 

 

COMPARISON:  

08/17/18. 

 

HISTORY:  

Intubated patient with respiratory failure. 

 

FINDINGS:

Single view of the chest shows normal sized cardiomediastinal silhouette. There is elevation of the r
ight hemidiaphragm. Bilateral lower lobe air space opacities are seen which may represent infiltrates
 and/or pleural effusions. No endotracheal tube is visualized. 

 

IMPRESSION: 

Bibasilar infiltrates with likely adjacent small pleural effusions. 

 

 

POS: [facility]

## 2018-08-19 LAB
ANION GAP SERPL CALC-SCNC: 16 MMOL/L (ref 10–20)
BASOPHILS # BLD AUTO: 0 THOU/UL (ref 0–0.2)
BASOPHILS NFR BLD AUTO: 0.1 % (ref 0–1)
BUN SERPL-MCNC: 20 MG/DL (ref 9.8–20.1)
CALCIUM SERPL-MCNC: 8.4 MG/DL (ref 7.8–10.44)
CHLORIDE SERPL-SCNC: 100 MMOL/L (ref 98–107)
CO2 SERPL-SCNC: 24 MMOL/L (ref 23–31)
CREAT CL PREDICTED SERPL C-G-VRATE: 35 ML/MIN (ref 70–130)
EOSINOPHIL # BLD AUTO: 0 THOU/UL (ref 0–0.7)
EOSINOPHIL NFR BLD AUTO: 0.2 % (ref 0–10)
GLUCOSE PLR-MCNC: 380 MG/DL
GLUCOSE SERPL-MCNC: 368 MG/DL (ref 83–110)
HGB BLD-MCNC: 14.3 G/DL (ref 12–16)
LDH PLR L TO P-CCNC: 543 U/L
LYMPHOCYTES # BLD: 0.9 THOU/UL (ref 1.2–3.4)
LYMPHOCYTES NFR BLD AUTO: 6.6 % (ref 21–51)
MCH RBC QN AUTO: 32.5 PG (ref 27–31)
MCV RBC AUTO: 98.3 FL (ref 78–98)
MONOCYTES # BLD AUTO: 0.4 THOU/UL (ref 0.11–0.59)
MONOCYTES NFR BLD AUTO: 3 % (ref 0–10)
NEUTROPHILS # BLD AUTO: 12.5 THOU/UL (ref 1.4–6.5)
NEUTROPHILS NFR BLD AUTO: 90.2 % (ref 42–75)
NEUTROPHILS NFR FLD: 78 %
NONHEMATIC CELLS NFR FLD MANUAL: 20 %
PLATELET # BLD AUTO: 89 THOU/UL (ref 130–400)
PLATELET BLD QL SMEAR: (no result)
POTASSIUM SERPL-SCNC: 3.5 MMOL/L (ref 3.5–5.1)
PROT PLR-MCNC: 1.4 G/DL
RBC # BLD AUTO: 4.4 MILL/UL (ref 4.2–5.4)
RBC # FLD AUTO: 97 /CUMM
RBC BACKGROUND COUNT: 0
SODIUM SERPL-SCNC: 136 MMOL/L (ref 136–145)
WBC # BLD AUTO: 13.8 THOU/UL (ref 4.8–10.8)
WBC # FLD AUTO: 870 /CUMM
WBC BACKGROUND COUNT: 0.01

## 2018-08-19 PROCEDURE — 0W993ZX DRAINAGE OF RIGHT PLEURAL CAVITY, PERCUTANEOUS APPROACH, DIAGNOSTIC: ICD-10-PCS | Performed by: INTERNAL MEDICINE

## 2018-08-19 NOTE — PRG
DATE OF SERVICE:  08/19/2018

 

SERVICE:  Pulmonary Medicine.

 

INTERVAL HISTORY:  The patient is actually doing quite a bit better today.  She is _____.  Her oxygen
 requirements are improving beautifully.  She does not have any specific complaints this morning.  Jojo cardenas is much more awake and oriented.  She appreciates where she is.  She remains extraordinarily weak; 
however.

 

PHYSICAL EXAMINATION:

VITAL SIGNS:  T-max overnight of 106.3, currently afebrile.  Pulse 109, blood pressure 127/74, respir
ations 15, and saturation 100% on nonrebreather currently.

GENERAL:  The patient is awake and alert.  No apparent distress.

LUNGS:  Rhonchi and crackles are present.  There is a prolonged expiratory phase with minimal wheezin
g.

HEART:  Normal rate, regular.

ABDOMEN:  Soft, nontender, nondistended.  Bowel sounds are positive.

MUSCULOSKELETAL:  No cyanosis or clubbing.  There is no pitting in the bilateral lower extremities.

NEUROLOGIC:  Grossly nonfocal.

 

LABORATORY DATA:  WBC 13.8, hemoglobin 14.3, platelets 89,000 and dropping.  Creatinine 1.49 and stab
ilizing, potassium 3.5.  Basic metabolic profile is otherwise unremarkable.  Lactate is slowly cleari
ng to 2.7.  Pseudomonas is growing in the urine, which is pansensitive.  The blood cultures are posit
lee for 2 different coag negative species in 1 out of 2.

 

IMAGING:  Chest x-ray demonstrates interval development of a very large effusion on the left. There i
s also a likely pleural effusion on the right.

 

ASSESSMENT:

1.  Acute hypoxic respiratory failure.

2.  Septic shock, resolving.

3.  Urinary tract infection.

4.  Chronic obstructive pulmonary disease with acute exacerbation.

5.  Pleural effusion, large.

 

DISCUSSION AND PLAN:  We will do a bedside ultrasound.  If we see a large pocket of fluid on the left
, this will be drained.  We will continue our empiric antibiotics.  It is not clear what has given ester ennis a severe fever, 3 days into the hospital stay on antibiotics the entire time.  She is cautiously c
learing some of her end-organ damage.  We are going to discontinue the bicarbonate drip today and keara
e her off BiPAP.  We will put her on a nonrebreather and titrate this down through time.  My suspicio
n is that a large effusion on the left caused some atelectasis resulting in the severe hypoxemia, we 
witnessed yesterday.

## 2018-08-19 NOTE — RAD
SINGLE VIEW CHEST:

 

Date:  08/19/18 

 

COMPARISON:  

08/18/18. 

 

HISTORY:  

Hypoxia. 

 

FINDINGS:

Single view of the chest shows a cardiomediastinal silhouette which is upper limits of normal in size
. There is a moderate left and a small right effusion. Adjacent atelectasis is also present. Degenera
tive changes are seen in the spine. 

 

IMPRESSION: 

Bilateral pleural effusions. 

 

 

POS: [facility]

## 2018-08-19 NOTE — PDOC.PN
- Subjective


Encounter Start Date: 08/19/18


Encounter Start Time: 10:50





No specific complaints, but has some difficulty speaking with the oxygen mask 

and tachypnea.





- Objective


Resuscitation Status: 


 











Resuscitation Status           DNR:Do Not Resuscitate














Vital Signs & Weight: 


 Vital Signs (12 hours)











  Temp Pulse Resp Pulse Ox


 


 08/19/18 12:57   108 H  17 


 


 08/19/18 07:55     100


 


 08/19/18 07:00  99.5 F   


 


 08/19/18 06:42   105 H  


 


 08/19/18 05:00  99.1 F   


 


 08/19/18 03:18   108 H  


 


 08/19/18 03:00  98.7 F   








 Weight











Weight                         171 lb 15.369 oz











 Most Recent Monitor Data











Heart Rate from ECG            108


 


NIBP                           84/57


 


NIBP BP-Mean                   67


 


Respiration from ECG           27


 


SpO2                           82














I&O: 


 











 08/18/18 08/19/18 08/20/18





 06:59 06:59 06:59


 


Intake Total 3019 4439 


 


Output Total 1955 940 50


 


Balance 1064 3499 -50











Result Diagrams: 


 08/19/18 03:45





 08/19/18 03:45





Phys Exam





- Physical Examination


Still very tachypneic.


Improved air exchange.  Still has rales and upper airway secretions. 


Cardiovascular: RRR, no significant murmur


Gastrointestinal: soft, non-tender, no distention, positive bowel sounds


Musculoskeletal: no edema





Dx/Plan


(1) Septic shock due to Pseudomonas species


Code(s): A41.52 - SEPSIS DUE TO PSEUDOMONAS; R65.21 - SEVERE SEPSIS WITH SEPTIC 

SHOCK   Status: Acute   Comment: Appeared to have some end organ damage, but 

improving now.     





(2) Acute respiratory failure with hypoxia


Code(s): J96.01 - ACUTE RESPIRATORY FAILURE WITH HYPOXIA   Status: Acute   

Comment: Improving.  Pulmonary following.  Tapped effusion this morning.     





(3) UTI (urinary tract infection)


Status: Acute   Comment: Pseudomonas.  Sensitive to the zosyn.   





(4) COPD (chronic obstructive pulmonary disease)


Status: Suspected   Comment: Still has some respiratory challenge.  Continue 

nebs, supportive oxygen.   





(5) Sepsis


Code(s): A41.9 - SEPSIS, UNSPECIFIED ORGANISM   Status: Acute   Comment: 

Stabilizing.   





(6) Respiratory failure


Code(s): J96.90 - RESPIRATORY FAILURE, UNSP, UNSP W HYPOXIA OR HYPERCAPNIA   

Status: Acute   Comment: Developed pleural effusion overnight.  Tapped by 

Pulmonary this morning.     





(7) Pneumonia


Code(s): J18.9 - PNEUMONIA, UNSPECIFIED ORGANISM   Status: Acute   Comment: 

Continue Zosyn.   





(8) Pleural effusion


Code(s): J90 - PLEURAL EFFUSION, NOT ELSEWHERE CLASSIFIED   Status: Acute   

Comment: Tapped.  Clear, straw colored fluid.   





(9) Septic shock


Code(s): A41.9 - SEPSIS, UNSPECIFIED ORGANISM; R65.21 - SEVERE SEPSIS WITH 

SEPTIC SHOCK   Status: Acute   





(10) Breast cancer


Status: Acute   Comment: Has chosen not to pursue treatment.     





(11) Paraspinal mass


Code(s): R22.2 - LOCALIZED SWELLING, MASS AND LUMP, TRUNK   Status: Acute   

Comment: Can be evaluated as outpatient when more stable.   





- Plan





* As above.


* Continue ICU care.

## 2018-08-20 LAB
ANION GAP SERPL CALC-SCNC: 15 MMOL/L (ref 10–20)
BUN SERPL-MCNC: 31 MG/DL (ref 9.8–20.1)
CALCIUM SERPL-MCNC: 8.9 MG/DL (ref 7.8–10.44)
CHLORIDE SERPL-SCNC: 101 MMOL/L (ref 98–107)
CO2 SERPL-SCNC: 24 MMOL/L (ref 23–31)
CREAT CL PREDICTED SERPL C-G-VRATE: 33 ML/MIN (ref 70–130)
GLUCOSE SERPL-MCNC: 268 MG/DL (ref 83–110)
HGB BLD-MCNC: 14.1 G/DL (ref 12–16)
MCH RBC QN AUTO: 32.8 PG (ref 27–31)
MCV RBC AUTO: 96.3 FL (ref 78–98)
MDIFF COMPLETE?: YES
PLATELET # BLD AUTO: 103 THOU/UL (ref 130–400)
PLATELET BLD QL SMEAR: (no result)
POTASSIUM SERPL-SCNC: 3.4 MMOL/L (ref 3.5–5.1)
RBC # BLD AUTO: 4.3 MILL/UL (ref 4.2–5.4)
SODIUM SERPL-SCNC: 137 MMOL/L (ref 136–145)
VANCOMYCIN TROUGH SERPL-MCNC: 26.5 UG/ML
WBC # BLD AUTO: 23.7 THOU/UL (ref 4.8–10.8)

## 2018-08-20 PROCEDURE — 5A09457 ASSISTANCE WITH RESPIRATORY VENTILATION, 24-96 CONSECUTIVE HOURS, CONTINUOUS POSITIVE AIRWAY PRESSURE: ICD-10-PCS | Performed by: HOSPITALIST

## 2018-08-20 RX ADMIN — DILTIAZEM HYDROCHLORIDE SCH MLS: 5 INJECTION INTRAVENOUS at 19:10

## 2018-08-20 NOTE — PRG
DATE OF SERVICE:  08/20/2018

 

SERVICE:  Pulmonary Medicine.

 

INTERVAL HISTORY:  The patient is doing fine from a respiratory standpoint.  She is actually doing fa
ntastic sitting up in a neuro chair today.  She denies having shortness of breath or chest discomfort
.  She did not have any events overnight.

 

PHYSICAL EXAMINATION:

VITAL SIGNS:  Afebrile with a T-max of 100.5, pulse 114, blood pressure 132/68, respirations 23, satu
ration 95% on room air.

GENERAL:  The patient is awake and alert, in no apparent distress.

LUNGS:  Rhonchi and crackles are present.  There is no prolonged expiratory phase or wheezing identif
ied.

HEART:  Normal rate, regular.

ABDOMEN:  Soft, nontender, nondistended.  Bowel sounds are positive.

MUSCULOSKELETAL:  No cyanosis or clubbing.  There is no pitting in the bilateral lower extremities.

NEUROLOGIC:  Grossly nonfocal.

 

LABORATORY DATA:  WBC is up trending to 23.7, hemoglobin 14.1, platelets 103,000 and rebounding.  Cre
atinine 1.57 and stabilizing, BUN 31.  Potassium 3.4.  Basic metabolic profile is otherwise unremarka
ble.  Thoracentesis is consistent with a parapneumonic effusion.  Pseudomonas aeruginosa is growing i
n the urine.  Blood cultures are negative in one of the two.  The other one is growing contaminants. 
 Body fluid culture and AFB smear are negative.

 

IMAGING:  Echocardiogram demonstrates 20%-25% ejection fraction with global hypokinesis.  There was s
ome mitral regurgitation present.

 

ASSESSMENT:

1.  Acute hypoxic respiratory failure, improving.

2.  Septic shock, resolving.

3.  Urinary tract infection.

4.  Chronic obstructive pulmonary disease with acute exacerbation.

5.  Acute systolic heart failure.

 

DISCUSSION AND PLAN:  We will get Cardiology consultation.  We will continue her antibiotics.  We ted
l watch her volume status very closely.  She will use her BiPAP on an as needed basis.  Otherwise, I 
do think she is going to remain in the ICU for at least the next 24 hours, but if she continues to ma
ke significant improvements, we will consider for transition to the medical unit in the morning.

## 2018-08-20 NOTE — PDOC.PN
- Subjective


Encounter Start Date: 08/03/18


Encounter Start Time: 11:44


Subjective: feels like "heck", some SOB





- Objective


Resuscitation Status: 


 











Resuscitation Status           DNR:Do Not Resuscitate














MAR Reviewed: Yes


Vital Signs & Weight: 


 Vital Signs (12 hours)











  Temp Pulse Pulse Pulse Resp BP BP


 


 08/20/18 08:15    118 H  118 H   136/62  120/62


 


 08/20/18 07:08       


 


 08/20/18 07:04   100    26 H  


 


 08/20/18 06:00  100.5 F H      


 


 08/20/18 04:00  101.0 F H      


 


 08/20/18 00:00  98.2 F      














  Pulse Ox Pulse Ox Pulse Ox


 


 08/20/18 08:15   96  98


 


 08/20/18 07:08  96  


 


 08/20/18 07:04  96  


 


 08/20/18 06:00   


 


 08/20/18 04:00   


 


 08/20/18 00:00   








 Weight











Admit Weight                   171 lb 15.369 oz


 


Weight                         185 lb 10.067 oz











 Most Recent Monitor Data











Heart Rate from ECG            111


 


NIBP                           104/55


 


NIBP BP-Mean                   65


 


Respiration from ECG           19


 


SpO2                           93














I&O: 


 











 08/19/18 08/20/18 08/21/18





 06:59 06:59 06:59


 


Intake Total 4439 1232 


 


Output Total 940 445 


 


Balance 3499 787 











Result Diagrams: 


 08/20/18 03:42





 08/20/18 03:41





Phys Exam





- Physical Examination


Neck: no JVD


bilat rhonchi, coarse DS


Cardiovascular: RRR, no significant murmur


Gastrointestinal: soft, positive bowel sounds


Musculoskeletal: edema present





Dx/Plan


(1) Acute respiratory failure with hypoxia


Code(s): J96.01 - ACUTE RESPIRATORY FAILURE WITH HYPOXIA   Status: Acute   

Comment: Improving.  Pulmonary following.  Tapped effusion this morning.     





(2) Breast cancer


Status: Chronic   Comment: Has chosen not to pursue treatment.     





(3) Pleural effusion


Code(s): J90 - PLEURAL EFFUSION, NOT ELSEWHERE CLASSIFIED   Status: Acute   

Comment: Tapped.  Clear, straw colored fluid.   





(4) Respiratory failure


Code(s): J96.90 - RESPIRATORY FAILURE, UNSP, UNSP W HYPOXIA OR HYPERCAPNIA   

Status: Acute   


Qualifiers: 


   Chronicity: acute   Respiratory failure complication: hypoxia   Qualified 

Code(s): J96.01 - Acute respiratory failure with hypoxia   


Comment: Developed pleural effusion overnight.  Tapped by Pulmonary this 

morning.     





(5) Septic shock


Code(s): A41.9 - SEPSIS, UNSPECIFIED ORGANISM; R65.21 - SEVERE SEPSIS WITH 

SEPTIC SHOCK   Status: Acute   





(6) Septic shock due to Pseudomonas species


Code(s): A41.52 - SEPSIS DUE TO PSEUDOMONAS; R65.21 - SEVERE SEPSIS WITH SEPTIC 

SHOCK   Status: Acute   Comment: Appeared to have some end organ damage, but 

improving now.     





(7) UTI (urinary tract infection)


Status: Acute   


Qualifiers: 


   Urinary tract infection type: site unspecified 


Comment: Pseudomonas.  Sensitive to the zosyn.   





(8) COPD (chronic obstructive pulmonary disease)


Status: Acute   


Qualifiers: 


   Chronic bronchitis type: unspecified 


Comment: Still has some respiratory challenge.  Continue nebs, supportive 

oxygen.   





(9) Pneumonia


Code(s): J18.9 - PNEUMONIA, UNSPECIFIED ORGANISM   Status: Acute   


Qualifiers: 


   Pneumonia type: due to unspecified organism   Laterality: bilateral   Lung 

location: lower lobe of lung   Qualified Code(s): J18.1 - Lobar pneumonia, 

unspecified organism   


Comment: Continue Zosyn.   





(10) Sepsis


Code(s): A41.9 - SEPSIS, UNSPECIFIED ORGANISM   Status: Acute   


Qualifiers: 


   Sepsis type: sepsis due to unspecified organism   Qualified Code(s): A41.9 - 

Sepsis, unspecified organism   


Comment: Stabilizing.   





- Plan


progressive renal failure


-: progressive adverse pulmnary chahges


-: cont iv antibx, sterrpoids


-: discuss with intensivist





* .

## 2018-08-20 NOTE — PQF
CLINICAL DOCUMENTATION IMPROVEMENT CLARIFICATION FORM:  ICD-10 Updated

PLEASE DO AN ADDENDUM TO THE PROGRESS NOTE WITH ANY DOCUMENTATION UPDATES OR 
ADDITIONS AND CARRY THROUGH TO DC SUMMARY.   THANK YOU.



DATE:  8/20/18                          ATTN: Dr. Douglas



Please exercise your independent, professional judgment in responding to the 
clarification form. 

Clinical indicators are provided on the bottom of this form for your review



Please check appropriate box(s):



_____x__   I (concur)  with the Nursing Assessment  findings as stated below.



[  ] Pressure Ulcer: (Stage I: Erythema; Stage II: Partial thickness; Stage III
: Full thickness; Stage IV: Necrosis to muscle/bone)

    [  ] Location: _________________________POA: [  ] Yes [  ] No[  ] Unable to 
determine

            Stage (I to IV): ____(Left____Right____Bilateral___N/A___)

    [  ] Location: _________________________POA: [  ] Yes [  ] No[  ] Unable to 
determine

            Stage (I to IV): ____(Left____Right____Bilateral___N/A___)

[  ] No pressure ulcer diagnosis

[  ] Deep tissue injury

[  ] Other diagnosis ___________

[  ] Unable to determine



In addition, please specify:

Present on Admission (POA):  [x  ] Yes             [  ] No             [  ] 
Unable to determine



For continuity of documentation, please document condition throughout progress 
notes and discharge summary.  Thank You.



CLINICAL INDICATORS - SIGNS / SYMPTOMS / LABS



ER NURSES SKIN ASSESSMENT 8/16 @ 2200: PRESSURE ULCER TO THE SACRUM, STAGE II



NURSES ASSESSMENT 8/17 @ 0329:  SACROCOCCYGEAL PRESSURE ULCER. STAGE II



RISKS: 

H&P 8/17: AGE 84. PT IS IN SEPTIC SHOCK WITH FEVER. UTI. COPD. 



TREATMENT: 

SKIN INTERVENTIONS PER NURSING PROTOCOL:  POSITION CHANGES: Q2H IN BED, Q1H IN 
CHAIR

                                                                              
SKIN KEPT FROM EXCESSIVE MOISTURE.

____________________________________________________________________





Thank you, 

Kim

(This form is maintained as a part of the permanent medical record)

 2015 [employer]  All Rights Reserved

Kim Scott RN, BSN    [email or web address]    Office: [phone]

                                                              

[facility]

## 2018-08-21 VITALS — DIASTOLIC BLOOD PRESSURE: 52 MMHG | SYSTOLIC BLOOD PRESSURE: 82 MMHG

## 2018-08-21 VITALS — TEMPERATURE: 98.7 F

## 2018-08-21 LAB
ANION GAP SERPL CALC-SCNC: 14 MMOL/L (ref 10–20)
BUN SERPL-MCNC: 41 MG/DL (ref 9.8–20.1)
CALCIUM SERPL-MCNC: 8.4 MG/DL (ref 7.8–10.44)
CHLORIDE SERPL-SCNC: 101 MMOL/L (ref 98–107)
CO2 SERPL-SCNC: 26 MMOL/L (ref 23–31)
CREAT CL PREDICTED SERPL C-G-VRATE: 31 ML/MIN (ref 70–130)
GLUCOSE SERPL-MCNC: 285 MG/DL (ref 83–110)
HGB BLD-MCNC: 13.5 G/DL (ref 12–16)
MCH RBC QN AUTO: 33 PG (ref 27–31)
MCV RBC AUTO: 96.3 FL (ref 78–98)
MDIFF COMPLETE?: YES
PLATELET # BLD AUTO: 108 THOU/UL (ref 130–400)
PLATELET BLD QL SMEAR: (no result)
POTASSIUM SERPL-SCNC: 3.1 MMOL/L (ref 3.5–5.1)
RBC # BLD AUTO: 4.09 MILL/UL (ref 4.2–5.4)
SODIUM SERPL-SCNC: 138 MMOL/L (ref 136–145)
WBC # BLD AUTO: 20 THOU/UL (ref 4.8–10.8)

## 2018-08-21 RX ADMIN — POTASSIUM CHLORIDE SCH MLS: 14.9 INJECTION, SOLUTION INTRAVENOUS at 08:57

## 2018-08-21 RX ADMIN — POTASSIUM CHLORIDE SCH MLS: 14.9 INJECTION, SOLUTION INTRAVENOUS at 08:58

## 2018-08-21 RX ADMIN — DILTIAZEM HYDROCHLORIDE SCH MLS: 5 INJECTION INTRAVENOUS at 05:20

## 2018-08-21 NOTE — CON-2
DATE OF CONSULTATION:  08/21/2018

 

CHIEF COMPLAINT:  Confusion.

 

HISTORY OF PRESENT ILLNESS:  The patient is an 84-year-old female that 
presented to the emergency room on 08/16/2018 and was found to be in urosepsis.
  The patient was also noted to have an echocardiogram that showed an EF of 20%-
25% with global hypokinesis.  Diastolic dysfunction could not be assessed 
secondary to tachycardia with mild mitral regurgitation and mild tricuspid 
regurgitation.  Upon further questioning with the family, the patient has not 
had any signs of fluid overload with lower extremity edema, orthopnea or 
paroxysmal nocturnal dyspnea over the last few months.  The patient has never 
been told that she has any sort of heart condition or irregular heartbeat.  The 
patient's family has noted that she does have a past history of breast cancer 
that they are opting not to treat as well as said she has declined in 
functioning over the last year slowly.  The family is wishing not to have any 
invasive intervention done at this time and is actually opting to not have even 
medical management completed at this time.  The patient denies any chest pain, 
any shortness of breath, any nausea, vomiting or diarrhea.  The patient also 
denies any lower extremity edema, any asymmetric swelling, any unilateral 
weakness, vision changes or headaches.  The patient's son is present for the 
interview and provides most of the history at this time.

 

PAST MEDICAL HISTORY:  Hypertension, left breast cancer without treatment, 
chronic back pain, hypothyroidism.

 

PAST SURGICAL HISTORY:  Appendectomy, hysterectomy, left breast lumpectomy.

 

FAMILY HISTORY:  Noncontributory to this case.

 

SOCIAL HISTORY:  The patient denies any alcohol use or drug use.  The patient 
does currently smoke approximately 1 pack of cigarettes per day.

 

ALLERGIES:  To MORPHINE.

 

MEDICATIONS:

1.  Levothyroxine 100 mcg daily.

2.  Aspirin 81 mg p.o. daily.

3.  Captopril/hydrochlorothiazide 50 mg/25 mg 1 tab p.o. daily.

4.  Gabapentin 300 mg p.o. at bedtime.

 

REVIEW OF SYSTEMS:  A 12-point review of systems was completed and was 
otherwise negative unless listed above in the HPI.

 

PHYSICAL EXAMINATION:

VITAL SIGNS:  Blood pressure 120/65, heart rate 124, oxygen saturation was 94% 
on nasal cannula, respiratory rate was 23.

GENERAL:  The patient is lying comfortably in bed, rather drowsy.  The patient 
is alert and oriented x2.

NECK:  No JVD.

RESPIRATORY:  Clear to auscultation with unlabored respirations.

CARDIOVASCULAR:  Irregular rate and rhythm.  No S3 or S4 noted.  No murmurs, 
thrills or gallops.

ABDOMEN:  Soft, nontender, nondistended.  No peritoneal signs.  No 
hepatosplenomegaly.  No abnormal state.

EXTREMITIES:  Peripheral pulses palpated throughout.  No cyanosis, clubbing or 
edema.

 

LABORATORY DATA:  Hemoglobin 13.5, hematocrit 39.4, white blood cell 20.0, 
platelet count 108,000.  Sodium 138, potassium 3.1, chloride 101, bicarbonate 26
, BUN 41, creatinine 1.79, glucose 285.

 

RADIOGRAPHIC EVALUATION:  The patient had a chest x-ray on 08/21/2018 that 
showed moderate bilateral pleural effusions.

 

ASSESSMENT AND PLAN:

1.  New onset cardiomyopathy with left ventricle ejection fraction of 20%-25%.  
The patient's family was counseled on proceeding with medical therapy as this 
may be due to just the acute illness.  The patient's family is not interested 
in further therapy either including optimizing medical management.  They would 
like to consult hospice to send the patient home for care and comfort as the 
patient has a lot of anxiety and does not want to be placed in a nursing home. 
Otherwise, this patient would like to continue current plan of care. 

2.  Atrial fibrillation with rapid ventricular response.  The patient is no 
longer on pressor support.  Respiratory status is stabilized; however, her 
blood pressure does remain low.  The patient is currently on diltiazem drip at 
5 with good rate control at this time. Will allow primary team to make further 
treatment changes as this patient's family does not want to pursue further 
medical management optimization. 

3.  Urosepsis.  We will defer management to the primary team.

4.  Acute kidney injury versus chronic kidney disease.  Ensure nephrotoxic 
drugs are avoided as well as monitoring hydration status.

5.  Hypokalemia.  The patient is currently receiving potassium replacement.  



At this time, per the family's wishes, cardiology will sign off unless 
otherwise notified of a change in status or change in wishes from the family.

 

This patient was seen and evaluated with Dr. Shamar Jin, the Cardiology 
attending.

 

HIRAL

## 2018-08-21 NOTE — PRG
DATE OF SERVICE:  08/21/2018

 

SERVICE:  Pulmonary Medicine.

 

INTERVAL HISTORY:  The patient is doing okay from a respiratory standpoint.  Yesterday, she got into 
a little bit of distress and got put back on BiPAP.  Overnight, she responded very nicely.  She denie
s any chest pain, nausea, vomiting.  She is not coughing up any sputum.  She continues to have a ratt
le deep inside of her chest.  Nursing reports no other overnight events.

 

PHYSICAL EXAMINATION:

VITAL SIGNS:  Afebrile, pulse 114, blood pressure 95/51, respirations 17, saturation 95% on 4 liters 
nasal cannula currently.

GENERAL:  The patient is awake and alert, in no apparent distress.

LUNGS:  Extensive rhonchi are present.  There is a prolonged expiratory phase.  I do not appreciate w
heezing, but crackles are appreciated.

HEART:  Normal rate, regular.

ABDOMEN:  Soft, nontender, nondistended.  Bowel sounds are positive.

MUSCULOSKELETAL:  No cyanosis or clubbing.  There is pitting at the sacrum.  No pitting in the lower 
extremities.

GENITOURINARY:  Pryor catheter in place.

NEUROLOGIC:  Grossly nonfocal.

 

LABORATORY DATA:  WBC 20.0, hemoglobin 13.5, platelets 108,000.  Creatinine 1.79 and gently up trendi
ng, BUN 41.  Potassium 3.1.  Blood sugar ranges from 268-368.  Fungal smear is currently pending.  Ur
ine culture is growing Pseudomonas, which is pansensitive.  Blood cultures negative x1 out of 2.  Bod
y fluid cultures are otherwise negative to date.

 

ASSESSMENT:

1.  Acute hypoxic respiratory failure.

2.  Acute systolic heart failure.

3.  Septic shock, resolving.

4.  Urinary tract infection secondary to Proteus.

5.  Chronic obstructive pulmonary disease with acute exacerbation.

 

DISCUSSION AND PLAN:  We will start to diurese her once she can tolerate it.  Cardiology opinion is isiah mabry pending.  She will need to stay in the ICU for an additional 24-48 hours.  From my perspecti
ve, the patient has very advanced debility.  The family is telling me that even at baseline, she did 
have a hard time participating with anything that physical therapy wanted to do.  She is adamant that
 she go home and not go to any type of a skilled facility.  As such, our choices are to continue lance ricardo what we are doing and move towards going home with hospice sooner.  I will continue to follow soniya alvarenga as she remains in this location.

## 2018-08-21 NOTE — CON
DATE OF CONSULTATION:  08/21/2018

 

REASON FOR CONSULTATION:  Cardiomyopathy.

 

Please see Dr. Robbie Shetty's full consultation for details.

 

HISTORY OF PRESENT ILLNESS:  Briefly, Ms. Cruz was recently admitted for urosepsis.  She did requi
re pressor support.  She also developed AFib with RVR.  She is now more stable.  She is off pressor s
upport.  Her respiratory status is stable.  Her blood pressure remains low.

 

She has no previous history of underlying coronary artery disease.  She has no previous history of PN
D, orthopnea or lower extremity edema.

 

PHYSICAL EXAMINATION:

GENERAL:  Patient is a pleasant female who is in no acute distress.  The patient appears her stated a
ge.

VITAL SIGNS:  Blood pressure 120/65, pulse 115, respirations 20.

NEUROLOGIC:  The patient is alert and oriented times 3 with no focal neurologic deficits.

HEENT:  Sclerae without icterus.  Mouth has moist mucous membranes with normal pallor.

NECK:  No JVD.  Carotid upstroke brisk.  No bruits bilaterally.

LUNGS:  Clear to auscultation with unlabored respirations.

BACK:  No scoliosis or kyphosis.

CARDIAC:  Regular rate and rhythm with normal S1 and S2.  No S3 or S4 noted.  No significant rubs, mu
rmurs, thrills, or gallops noted throughout the precordium.  PMI is not displaced.  There is no tripp
ternal heave.

ABDOMEN:  Soft, nontender, nondistended.  No peritoneal signs present.  No hepatosplenomegaly.  No ab
normal striae.

EXTREMITIES:  2+ femoral and 2+ dorsalis pedis pulses.  No cyanosis, clubbing, or edema.

SKIN:  No gross abnormalities.

 

PERTINENT LABS:  Hemoglobin 13, creatinine 1.79.

 

IMPRESSION:

1.  New onset cardiomyopathy with left ventricular ejection fraction 20%-25%.

2.  Recent urosepsis.

 

RECOMMENDATIONS:  Likely related to recent septic episode.  I did discuss proceeding with medical the
rapy with family as well as repeating her echo.  At this point, they are not interested in any furthe
r therapy including medication.  They are going to consult with hospice.  Otherwise, from my standpoi
nt, I have no further recommendations.  Please reconsult if things change.

## 2018-08-21 NOTE — RAD
SINGLE VIEW OF THE CHEST:

 

COMPARISON: 

8/19/18.

 

HISTORY: 

Status post thoracocentesis.

 

FINDINGS: 

A single view of the chest shows a normal-size cardiomediastinal silhouette.  There are small to mode
rate bilateral pleural effusions.  The left-sided effusion is smaller than the prior exam.  No pneumo
thorax is seen.  Degenerative changes are seen in the spine.

 

IMPRESSION: 

Moderate bilateral pleural effusions.

 

[place] [facility] [place]

## 2018-08-21 NOTE — PDOC.PN
- Subjective


Encounter Start Date: 08/21/18


Encounter Start Time: 07:16


Subjective: weak, requiring BIPAP





- Objective


Resuscitation Status: 


 











Resuscitation Status           DNR:Do Not Resuscitate














MAR Reviewed: Yes


Vital Signs & Weight: 


 Vital Signs (12 hours)











  Temp Pulse Resp Pulse Ox


 


 08/21/18 06:42   95  


 


 08/21/18 06:41   112 H  17  95


 


 08/21/18 04:00  98.1 F    98


 


 08/21/18 02:38   104 H  


 


 08/21/18 00:27   108 H  25 H 


 


 08/21/18 00:00  99.2 F   


 


 08/20/18 20:00  98.1 F  139 H  19  95


 


 08/20/18 19:20   114 H  








 Weight











Admit Weight                   171 lb 15.369 oz


 


Weight                         186 lb 8.177 oz











 Most Recent Monitor Data











Heart Rate from ECG            91


 


NIBP                           101/51


 


NIBP BP-Mean                   69


 


Respiration from ECG           18


 


SpO2                           96














I&O: 


 











 08/20/18 08/21/18 08/22/18





 06:59 06:59 06:59


 


Intake Total 1232 1473 


 


Output Total 445 1190 


 


Balance 787 283 











Result Diagrams: 


 08/21/18 04:53





 08/21/18 04:53


Radiology Reviewed by me: Yes (cxr-bilat pleural effusions, adverse)


EKG Reviewed by me: Yes (atrial flutter with variable block)





Phys Exam





- Physical Examination


Constitutional: NAD


dull to perc in bases, coarse BS


Cardiovascular: no significant murmur, irregular


Gastrointestinal: soft, positive bowel sounds


Musculoskeletal: no edema





Dx/Plan


(1) Acute respiratory failure with hypoxia


Code(s): J96.01 - ACUTE RESPIRATORY FAILURE WITH HYPOXIA   Status: Acute   

Comment: Improving.  Pulmonary following.  Tapped effusion this morning.     





(2) Breast cancer


Status: Chronic   Comment: Has chosen not to pursue treatment.     





(3) Pleural effusion


Code(s): J90 - PLEURAL EFFUSION, NOT ELSEWHERE CLASSIFIED   Status: Acute   

Comment: Tapped.  Clear, straw colored fluid.   





(4) Respiratory failure


Code(s): J96.90 - RESPIRATORY FAILURE, UNSP, UNSP W HYPOXIA OR HYPERCAPNIA   

Status: Acute   


Qualifiers: 


   Chronicity: acute   Respiratory failure complication: hypoxia   Qualified 

Code(s): J96.01 - Acute respiratory failure with hypoxia   


Comment: Developed pleural effusion overnight.  Tapped by Pulmonary this 

morning.     





(5) Septic shock


Code(s): A41.9 - SEPSIS, UNSPECIFIED ORGANISM; R65.21 - SEVERE SEPSIS WITH 

SEPTIC SHOCK   Status: Acute   





(6) Septic shock due to Pseudomonas species


Code(s): A41.52 - SEPSIS DUE TO PSEUDOMONAS; R65.21 - SEVERE SEPSIS WITH SEPTIC 

SHOCK   Status: Acute   Comment: Appeared to have some end organ damage, but 

improving now.     





(7) UTI (urinary tract infection)


Status: Acute   


Qualifiers: 


   Urinary tract infection type: site unspecified 


Comment: Pseudomonas.  Sensitive to the zosyn.   





(8) COPD (chronic obstructive pulmonary disease)


Status: Acute   


Qualifiers: 


   Chronic bronchitis type: unspecified 


Comment: Still has some respiratory challenge.  Continue nebs, supportive 

oxygen.   





(9) Pneumonia


Code(s): J18.9 - PNEUMONIA, UNSPECIFIED ORGANISM   Status: Acute   


Qualifiers: 


   Pneumonia type: due to unspecified organism   Laterality: bilateral   Lung 

location: lower lobe of lung   Qualified Code(s): J18.1 - Lobar pneumonia, 

unspecified organism   


Comment: Continue Zosyn.   





(10) Sepsis


Code(s): A41.9 - SEPSIS, UNSPECIFIED ORGANISM   Status: Acute   


Qualifiers: 


   Sepsis type: sepsis due to unspecified organism   Qualified Code(s): A41.9 - 

Sepsis, unspecified organism   


Comment: Stabilizing.   





(11) Acute renal failure


Status: Acute   


Qualifiers: 


   Acute renal failure type: unspecified   Qualified Code(s): N17.9 - Acute 

kidney failure, unspecified   





(12) Atrial flutter with rapid ventricular response


Code(s): I48.92 - UNSPECIFIED ATRIAL FLUTTER   Status: Acute   





- Plan


progressive renal failure, adverse pleural effusions, discuss fluids with 


-:   intensivist


-: now on cardizem iv for rate control


-: requiring BIPAP for resp support


-: on iv steroids, duoneb





* .

## 2018-08-21 NOTE — DIS
DATE OF ADMISSION:  08/16/2018

 

DATE OF DISCHARGE:  08/21/2018

 

TRANSFER OF CARE

 

DISCHARGE DISPOSITION:  Discharged to [facility].

 

PRIMARY CARE PROVIDER:  Saulo Gomes M.D.

 

FINAL DIAGNOSES:  Atrial flutter with rapid ventricular response, acute respiratory failure with hypo
geri, pleural effusion, bilateral septic shock syndrome, urinary tract infection with Pseudomonas spec
ies, chronic obstructive pulmonary disease, pneumonia, acute renal failure, acute cardiomyopathy.

 

DISCHARGE MEDICATIONS:  She is being discharged on the following medications, which are of course karel
ceptible to being changed by hospice, Protonix 40 mg a day, Zofran 4 mg IV q.6 hours, DuoNeb 3 mL q.6
 hours, Tylenol 650 mg p.o. or IN p.r.n.

 

ALLERGIES:  To MORPHINE.

 

DIET:  As tolerated.

 

CODE STATUS:  DNR.  Outpatient DNR completed.

 

HOSPITAL COURSE:  The patient admitted to [facility] on 08/16/2018 with confusion, history o
f left breast cancer, chronic pain, hypertension.  The patient was placed in ICU.  She was on pressor
s.  Cultures were obtained.  Antibiotics started.  Urine culture also obtained.  Chest x-ray on admis
pallavi, no evidence of pneumonia or heart failure.  Laboratory on admission, white count 11.9 with an a
bsolute neutrophilia, hemoglobin 15.0, platelet count 261,000.  Blood gas; pH of 7.13, _____ 100% O2.
  Sodium 128, potassium 3.5, creatinine 0.77.  Brain CT, no acute intracranial abnormality.  Abdomina
l pelvis CT reveals small bilateral pleural effusions, mild pulmonary edema, a nodule in the left tracey
gula and a soft tissue mass in the posterior elements of L4.  The patient was seen in consultation by
 Dr. Quentin Perez, Pulmonology intensivist.  She was given IV fluids, he agreed with antibiotics, s
tayed in the ICU.  The patient was somewhat improved on that day.  Chest x-ray on 08/18/2018 reveal b
ibasilar infiltrates with effusions.  The patient was on antibiotics continued.  On 08/19/2018, she n
ow had a moderate left and small right pleural effusion.  Temperature was elevated at 106, pulse 109,
 blood pressure 127/75, respirations 15, 100% sat on a nonrebreather.  Her cultures grew Pseudomonas 
aeruginosa pansensitive out of her urine.  Blood cultures were normal.  By 08/18/2018, her white coun
t was up to 22,000, hemoglobin stable at 14.8.  She had up to 16% bands.  Her creatinine continued to
 rise during her hospital stay with decreased urine output, increased renal failure, lactic acid incr
eased.  Antibiotics were continued with piperacillin and vancomycin.  On 08/20/2018, a thoracentesis 
was performed.  Fluid was obtained.  Cultures have been negative.  Fluid revealed protein of 1.4.  LD
H 543, _____.  Vancomycin was followed, trough levels.  On 08/20/2018, she went into atrial flutter w
ith rapid ventricular response.  By this time, her chest x-ray, moderate bilateral pleural effusions,
 pulmonary vascular congestion.  Echocardiogram was done 08/19/2018, showed a serious decrease in EF 
of 20%-25%.  The patient's status has continued to deteriorate.  She was seen in consultation today b
y Dr. Shamar Jin.  Between Dr. Jin, myself and Dr. Perez, we agreed with the family's 
desire for hospice management as the patient had refused therapy for her breast cancer.  She has been
 given an out of hospital DNR.  She is currently being prepared for transfer to [facility]
 inpatient.

 

Thirty five minutes spent preparing this discharge.

## 2018-08-24 NOTE — EKG
Test Reason : STAT

Blood Pressure : ***/*** mmHG

Vent. Rate : 108 BPM     Atrial Rate : 108 BPM

   P-R Int : 170 ms          QRS Dur : 122 ms

    QT Int : 332 ms       P-R-T Axes : 040 -32 080 degrees

   QTc Int : 444 ms

 

Sinus tachycardia

Left axis deviation

Non-specific intra-ventricular conduction delay

Nonspecific T wave abnormality

Abnormal ECG

When compared with ECG of 16-AUG-2018 20:49, (Unconfirmed)

Significant changes have occurred

Confirmed by KINRDA WOODS MD ([id number]) on 8/24/2018 2:58:55 PM

 

Referred By:             Confirmed By:KINDRA WOODS MD

## 2020-09-18 NOTE — CT
CT HEAD NONCONTRAST:

 

INDICATIONS:

Fall with head injury.  Pain.

 

COMPARISON:

08/01/2008

 

FINDINGS:

There is stable atrophy with compensatory dilatation of the ventricular system.  Encephalomalacia of 
the right frontal lobe, anteriorly, is again seen, which is superimposed upon mild chronic ischemic d
isease.  There are punctate areas of diminished attenuation of the bilateral basal ganglia, compatibl
e with lacunar infarctions.  The calvarium is intact.  No fluid level of the imaged paranasal sinuses
.

 

IMPRESSION:

1.  No acute intracranial hemorrhage or mass effect.

 

2.  Incidental note of focal hyperdensity of the left suboccipital region, partially imaged.  This co
uld relate to volume averaging of the posterior paraspinous/left neck musculature, although the findi
ng is not confirmed.  As necessary, followup with cervical spine CT may be performed to confirm trave
rsing neck musculature of this region.

 

CODE T

 

POS: [facility] heavy meconium